# Patient Record
Sex: FEMALE | Employment: FULL TIME | URBAN - METROPOLITAN AREA
[De-identification: names, ages, dates, MRNs, and addresses within clinical notes are randomized per-mention and may not be internally consistent; named-entity substitution may affect disease eponyms.]

---

## 2022-10-24 ENCOUNTER — HOSPITAL ENCOUNTER (OUTPATIENT)
Dept: LAB | Age: 47
Discharge: HOME | End: 2022-10-24

## 2022-10-24 LAB — SENTARA SPECIMEN COL,SENBCF: NORMAL

## 2022-10-24 PROCEDURE — 99001 SPECIMEN HANDLING PT-LAB: CPT

## 2023-06-23 SDOH — HEALTH STABILITY: PHYSICAL HEALTH: ON AVERAGE, HOW MANY MINUTES DO YOU ENGAGE IN EXERCISE AT THIS LEVEL?: 30 MIN

## 2023-06-23 SDOH — HEALTH STABILITY: PHYSICAL HEALTH: ON AVERAGE, HOW MANY DAYS PER WEEK DO YOU ENGAGE IN MODERATE TO STRENUOUS EXERCISE (LIKE A BRISK WALK)?: 6 DAYS

## 2023-06-26 ENCOUNTER — OFFICE VISIT (OUTPATIENT)
Age: 48
End: 2023-06-26
Payer: COMMERCIAL

## 2023-06-26 VITALS — WEIGHT: 195.2 LBS | BODY MASS INDEX: 35.92 KG/M2 | HEIGHT: 62 IN

## 2023-06-26 DIAGNOSIS — G56.01 RIGHT CARPAL TUNNEL SYNDROME: ICD-10-CM

## 2023-06-26 DIAGNOSIS — M65.312 TRIGGER THUMB OF LEFT HAND: Primary | ICD-10-CM

## 2023-06-26 DIAGNOSIS — G56.21 CUBITAL TUNNEL SYNDROME, RIGHT: ICD-10-CM

## 2023-06-26 PROCEDURE — 99203 OFFICE O/P NEW LOW 30 MIN: CPT | Performed by: ORTHOPAEDIC SURGERY

## 2023-06-26 PROCEDURE — 20550 NJX 1 TENDON SHEATH/LIGAMENT: CPT | Performed by: ORTHOPAEDIC SURGERY

## 2023-06-26 RX ORDER — IBUPROFEN 800 MG/1
800 TABLET ORAL 2 TIMES DAILY
COMMUNITY
Start: 2023-03-21

## 2023-06-26 RX ORDER — CYCLOBENZAPRINE HCL 10 MG
TABLET ORAL
COMMUNITY
Start: 2023-03-28

## 2023-07-12 ENCOUNTER — OFFICE VISIT (OUTPATIENT)
Age: 48
End: 2023-07-12

## 2023-07-12 VITALS — RESPIRATION RATE: 18 BRPM | HEIGHT: 62 IN | BODY MASS INDEX: 35.7 KG/M2

## 2023-07-12 DIAGNOSIS — G89.29 CHRONIC LEFT SHOULDER PAIN: ICD-10-CM

## 2023-07-12 DIAGNOSIS — M25.512 CHRONIC LEFT SHOULDER PAIN: ICD-10-CM

## 2023-07-12 DIAGNOSIS — M75.102 NONTRAUMATIC TEAR OF LEFT ROTATOR CUFF, UNSPECIFIED TEAR EXTENT: Primary | ICD-10-CM

## 2023-07-27 ENCOUNTER — HOSPITAL ENCOUNTER (OUTPATIENT)
Facility: HOSPITAL | Age: 48
Discharge: HOME | End: 2023-07-27
Attending: ORTHOPAEDIC SURGERY
Payer: COMMERCIAL

## 2023-07-27 DIAGNOSIS — M75.102 NONTRAUMATIC TEAR OF LEFT ROTATOR CUFF, UNSPECIFIED TEAR EXTENT: ICD-10-CM

## 2023-07-27 PROCEDURE — 73221 MRI JOINT UPR EXTREM W/O DYE: CPT

## 2023-07-31 ENCOUNTER — TELEPHONE (OUTPATIENT)
Age: 48
End: 2023-07-31

## 2023-07-31 NOTE — TELEPHONE ENCOUNTER
Patient called for . Patient had the mri of the left shoulder done on 7/27/23 at [facility]. Patient said she saw the results of the mri on My Chart ,but she does not understand it. Patient is asking for a call back with the mri results. Patient tel. [phone]. Note :patient aware that Nader Beasley is in sx today.

## 2023-08-04 ENCOUNTER — OFFICE VISIT (OUTPATIENT)
Age: 48
End: 2023-08-04
Payer: COMMERCIAL

## 2023-08-04 VITALS — HEIGHT: 62 IN | WEIGHT: 181 LBS | BODY MASS INDEX: 33.31 KG/M2

## 2023-08-04 DIAGNOSIS — M75.122 NONTRAUMATIC COMPLETE TEAR OF LEFT ROTATOR CUFF: Primary | ICD-10-CM

## 2023-08-04 DIAGNOSIS — S46.102A INJURY OF TENDON OF LONG HEAD OF BICEPS, LEFT, INITIAL ENCOUNTER: ICD-10-CM

## 2023-08-04 PROCEDURE — 99214 OFFICE O/P EST MOD 30 MIN: CPT | Performed by: ORTHOPAEDIC SURGERY

## 2023-08-17 ENCOUNTER — PROCEDURE VISIT (OUTPATIENT)
Age: 48
End: 2023-08-17

## 2023-08-17 VITALS
HEART RATE: 84 BPM | WEIGHT: 181.4 LBS | OXYGEN SATURATION: 98 % | RESPIRATION RATE: 16 BRPM | TEMPERATURE: 97.4 F | HEIGHT: 62 IN | BODY MASS INDEX: 33.38 KG/M2 | DIASTOLIC BLOOD PRESSURE: 68 MMHG | SYSTOLIC BLOOD PRESSURE: 133 MMHG

## 2023-08-17 DIAGNOSIS — R94.131 ABNORMAL EMG: ICD-10-CM

## 2023-08-17 DIAGNOSIS — G56.01 CARPAL TUNNEL SYNDROME OF RIGHT WRIST: ICD-10-CM

## 2023-08-17 DIAGNOSIS — R20.0 NUMBNESS AND TINGLING OF RIGHT UPPER EXTREMITY: Primary | ICD-10-CM

## 2023-08-17 DIAGNOSIS — R20.2 NUMBNESS AND TINGLING OF RIGHT UPPER EXTREMITY: Primary | ICD-10-CM

## 2023-08-17 NOTE — PROGRESS NOTES
Agreement on procedure being performed was verified  * Risks and Benefits explained to the patient  * Procedure site verified and marked as necessary  * Patient was positioned for comfort  * Consent was signed and verified     Time: 2:21 PM     Date of procedure: 8/17/2023    Procedure performed by:  Delana Schwab, MD    Provider accompanied by: Roberta.     Patient accompanied by: Family member    How tolerated by patient: tolerated the procedure well with no complications    Post Procedural Pain Scale: 0 - No Hurt    Comments: none    Written by Mali Chavarria as dictated by Raul Mckay MD

## 2023-08-21 ENCOUNTER — OFFICE VISIT (OUTPATIENT)
Age: 48
End: 2023-08-21
Payer: COMMERCIAL

## 2023-08-21 VITALS — HEIGHT: 62 IN | BODY MASS INDEX: 33.27 KG/M2 | WEIGHT: 180.8 LBS

## 2023-08-21 DIAGNOSIS — G56.01 RIGHT CARPAL TUNNEL SYNDROME: Primary | ICD-10-CM

## 2023-08-21 PROCEDURE — 99214 OFFICE O/P EST MOD 30 MIN: CPT | Performed by: ORTHOPAEDIC SURGERY

## 2024-01-05 ENCOUNTER — OFFICE VISIT (OUTPATIENT)
Age: 49
End: 2024-01-05

## 2024-01-05 VITALS — BODY MASS INDEX: 31.93 KG/M2 | HEIGHT: 62 IN

## 2024-01-05 DIAGNOSIS — M75.122 NONTRAUMATIC COMPLETE TEAR OF LEFT ROTATOR CUFF: Primary | ICD-10-CM

## 2024-01-05 PROCEDURE — 99024 POSTOP FOLLOW-UP VISIT: CPT | Performed by: ORTHOPAEDIC SURGERY

## 2024-01-05 NOTE — PROGRESS NOTES
[facility] AMBULATORY OFFICE NOTE    Patient: Mahi Patel                MRN: [id number]       SSN: [id number]  YOB: 1975        AGE: 48 y.o.        SEX: female  Body mass index is 31.93 kg/m².    PCP: Freya Mooney APRN - NP  24    Chief Complaint: Left shoulder follow-up    HPI: Mahi Patel is a 48 y.o. female patient who returns today 1 week out from her left shoulder surgery.  She is doing well.  She is not taking narcotic pain medication.  She is in the sling.    Past Medical History:   Diagnosis Date    Bursitis     Shoulder    Carpal tunnel syndrome        No family history on file.    Current Outpatient Medications   Medication Sig Dispense Refill    cyclobenzaprine (FLEXERIL) 10 MG tablet TAKE 1 TABLET BY MOUTH EVERYDAY AT BEDTIME      ibuprofen (ADVIL;MOTRIN) 800 MG tablet Take 1 tablet by mouth 2 times daily       No current facility-administered medications for this visit.       Allergies   Allergen Reactions    Hydrocodone Other (See Comments)     \"gives me really bad nightmares\"    Peanut (Diagnostic) Itching    Penicillins Other (See Comments)    Propoxyphene Diarrhea    Penicillin G Hives and Rash       Past Surgical History:   Procedure Laterality Date    CARPAL TUNNEL RELEASE      HAND SURGERY  Oct 2023    Carpal tunnel    SHOULDER SURGERY  ,     Rotary cuff       Social History     Socioeconomic History    Marital status: Single     Spouse name: Not on file    Number of children: Not on file    Years of education: Not on file    Highest education level: Not on file   Occupational History    Not on file   Tobacco Use    Smoking status: Former     Current packs/day: 0.00     Average packs/day: 0.5 packs/day for 1.1 years (0.5 ttl pk-yrs)     Types: Cigarettes     Start date: 1995     Quit date: 1996     Years since quittin.9     Passive exposure: Never    Smokeless tobacco: Not on file   Substance and Sexual

## 2024-01-24 ENCOUNTER — OFFICE VISIT (OUTPATIENT)
Age: 49
End: 2024-01-24

## 2024-01-24 VITALS — BODY MASS INDEX: 32.02 KG/M2 | WEIGHT: 174 LBS | HEIGHT: 62 IN

## 2024-01-24 DIAGNOSIS — M75.122 NONTRAUMATIC COMPLETE TEAR OF LEFT ROTATOR CUFF: Primary | ICD-10-CM

## 2024-01-24 PROCEDURE — 99024 POSTOP FOLLOW-UP VISIT: CPT | Performed by: ORTHOPAEDIC SURGERY

## 2024-01-24 NOTE — PROGRESS NOTES
[facility] AMBULATORY OFFICE NOTE    Patient: Mahi Patel                MRN: [id number]       SSN: [id number]  YOB: 1975        AGE: 48 y.o.        SEX: female  Body mass index is 31.83 kg/m².    PCP: Freya Mooney APRN - NP  24    Chief Complaint: Left shoulder follow-up    HPI: Mahi Patel is a 48 y.o. female patient who returns today for her left shoulder.  She is now 1 month out from surgery.  Pain is 2 out of 10.    Past Medical History:   Diagnosis Date    Bursitis     Shoulder    Carpal tunnel syndrome        No family history on file.    Current Outpatient Medications   Medication Sig Dispense Refill    cyclobenzaprine (FLEXERIL) 10 MG tablet TAKE 1 TABLET BY MOUTH EVERYDAY AT BEDTIME      ibuprofen (ADVIL;MOTRIN) 800 MG tablet Take 1 tablet by mouth 2 times daily       No current facility-administered medications for this visit.       Allergies   Allergen Reactions    Hydrocodone Other (See Comments)     \"gives me really bad nightmares\"    Peanut (Diagnostic) Itching    Penicillins Other (See Comments)    Propoxyphene Diarrhea    Penicillin G Hives and Rash       Past Surgical History:   Procedure Laterality Date    CARPAL TUNNEL RELEASE      HAND SURGERY  Oct 2023    Carpal tunnel    SHOULDER SURGERY  ,     Rotary cuff       Social History     Socioeconomic History    Marital status: Single     Spouse name: Not on file    Number of children: Not on file    Years of education: Not on file    Highest education level: Not on file   Occupational History    Not on file   Tobacco Use    Smoking status: Former     Current packs/day: 0.00     Average packs/day: 0.5 packs/day for 1.1 years (0.5 ttl pk-yrs)     Types: Cigarettes     Start date: 1995     Quit date: 1996     Years since quittin.9     Passive exposure: Never    Smokeless tobacco: Not on file   Substance and Sexual Activity    Alcohol use: Never    Drug use: Never

## 2024-02-05 ENCOUNTER — TELEPHONE (OUTPATIENT)
Facility: HOSPITAL | Age: 49
End: 2024-02-05

## 2024-02-28 ENCOUNTER — OFFICE VISIT (OUTPATIENT)
Age: 49
End: 2024-02-28

## 2024-02-28 DIAGNOSIS — M75.122 NONTRAUMATIC COMPLETE TEAR OF LEFT ROTATOR CUFF: Primary | ICD-10-CM

## 2024-02-28 PROCEDURE — 99024 POSTOP FOLLOW-UP VISIT: CPT | Performed by: ORTHOPAEDIC SURGERY

## 2024-02-28 NOTE — PROGRESS NOTES
[facility] AMBULATORY OFFICE NOTE    Patient: Mahi Patel                MRN: [id number]       SSN: [id number]  YOB: 1975        AGE: 48 y.o.        SEX: female  There is no height or weight on file to calculate BMI.    PCP: Freya Mooney APRN - NP  24    Chief Complaint: Left shoulder follow-up    HPI: Mahi Patel is a 48 y.o. female patient who returns today 2 months out from her left shoulder revision rotator cuff repair.  She is doing well.  Pain 2 out of 10.    Past Medical History:   Diagnosis Date    Bursitis     Shoulder    Carpal tunnel syndrome        No family history on file.    Current Outpatient Medications   Medication Sig Dispense Refill    cyclobenzaprine (FLEXERIL) 10 MG tablet TAKE 1 TABLET BY MOUTH EVERYDAY AT BEDTIME      ibuprofen (ADVIL;MOTRIN) 800 MG tablet Take 1 tablet by mouth 2 times daily       No current facility-administered medications for this visit.       Allergies   Allergen Reactions    Hydrocodone Other (See Comments)     \"gives me really bad nightmares\"    Peanut (Diagnostic) Itching    Penicillins Other (See Comments)    Propoxyphene Diarrhea    Penicillin G Hives and Rash       Past Surgical History:   Procedure Laterality Date    CARPAL TUNNEL RELEASE      HAND SURGERY  Oct 2023    Carpal tunnel    SHOULDER SURGERY  ,     Rotary cuff       Social History     Socioeconomic History    Marital status: Single     Spouse name: Not on file    Number of children: Not on file    Years of education: Not on file    Highest education level: Not on file   Occupational History    Not on file   Tobacco Use    Smoking status: Former     Current packs/day: 0.00     Average packs/day: 0.5 packs/day for 1.1 years (0.5 ttl pk-yrs)     Types: Cigarettes     Start date: 1995     Quit date: 1996     Years since quittin.0     Passive exposure: Never    Smokeless tobacco: Not on file   Substance and Sexual Activity